# Patient Record
Sex: FEMALE | URBAN - METROPOLITAN AREA
[De-identification: names, ages, dates, MRNs, and addresses within clinical notes are randomized per-mention and may not be internally consistent; named-entity substitution may affect disease eponyms.]

---
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
## 2021-11-29 NOTE — PROCEDURE: PATHOLOGY BILLING
Patient's father calling for an update. Please call to discuss.    [phone]   Cpt Code (93223, 25557 Or 05589): 38996 Cpt Code: 56818

## 2022-03-09 ENCOUNTER — OFFICE VISIT (OUTPATIENT)
Dept: URGENT CARE | Facility: URGENT CARE | Age: 22
End: 2022-03-09
Payer: COMMERCIAL

## 2022-03-09 VITALS
OXYGEN SATURATION: 98 % | DIASTOLIC BLOOD PRESSURE: 74 MMHG | HEART RATE: 91 BPM | RESPIRATION RATE: 16 BRPM | WEIGHT: 105 LBS | TEMPERATURE: 98.4 F | SYSTOLIC BLOOD PRESSURE: 120 MMHG

## 2022-03-09 DIAGNOSIS — J03.90 TONSILLITIS: Primary | ICD-10-CM

## 2022-03-09 LAB
DEPRECATED S PYO AG THROAT QL EIA: NEGATIVE
GROUP A STREP BY PCR: NOT DETECTED

## 2022-03-09 PROCEDURE — 99203 OFFICE O/P NEW LOW 30 MIN: CPT | Performed by: PHYSICIAN ASSISTANT

## 2022-03-09 PROCEDURE — 87651 STREP A DNA AMP PROBE: CPT | Performed by: PHYSICIAN ASSISTANT

## 2022-03-09 RX ORDER — IBUPROFEN 200 MG
TABLET ORAL
COMMUNITY

## 2022-03-09 RX ORDER — AMOXICILLIN 875 MG
875 TABLET ORAL 2 TIMES DAILY
Qty: 20 TABLET | Refills: 0 | Status: SHIPPED | OUTPATIENT
Start: 2022-03-09 | End: 2022-03-19

## 2022-03-09 RX ORDER — NORETHINDRONE ACETATE AND ETHINYL ESTRADIOL 1MG-20(21)
1 KIT ORAL DAILY
COMMUNITY

## 2022-03-09 RX ORDER — PREDNISONE 20 MG/1
20 TABLET ORAL 2 TIMES DAILY
Qty: 10 TABLET | Refills: 0 | Status: SHIPPED | OUTPATIENT
Start: 2022-03-09 | End: 2022-03-14

## 2022-03-09 ASSESSMENT — ENCOUNTER SYMPTOMS
RESPIRATORY NEGATIVE: 1
GASTROINTESTINAL NEGATIVE: 1
PALPITATIONS: 0
FEVER: 0
SINUS PAIN: 0
ADENOPATHY: 1
RHINORRHEA: 0
FATIGUE: 0
SHORTNESS OF BREATH: 0
HEADACHES: 1
CHILLS: 1
WHEEZING: 0
SINUS PRESSURE: 0
SORE THROAT: 1
COUGH: 0
CARDIOVASCULAR NEGATIVE: 1

## 2022-03-09 NOTE — PROGRESS NOTES
Sundeep Sanchez is a 21 year old who presents for the following health issues   HPI   Acute Illness  Acute illness concerns:   Onset/Duration: 2days  Symptoms:  Fever: no  Chills/Sweats: Yes  Headache (location?): Yes  Sinus Pressure: no  Conjunctivitis:  no  Ear Pain: no  Rhinorrhea: no  Congestion: no  Sore Throat: YES, reports hx of tonsillitis as a kid and this feels similar to that  Cough: no  Wheeze: no  Decreased Appetite: no  Nausea: no  Vomiting: no  Diarrhea: no  Dysuria/Freq.: no  Dysuria or Hematuria: no  Fatigue/Achiness: no  Sick/Strep Exposure: YES- possibly  Therapies tried and outcome: rest, fluids, motrin with minimal relief    There is no problem list on file for this patient.    Current Outpatient Medications   Medication     ibuprofen (ADVIL/MOTRIN) 200 MG tablet     norethindrone-ethinyl estradiol (JUNEL FE 1/20) 1-20 MG-MCG tablet     No current facility-administered medications for this visit.        Allergies   Allergen Reactions     Fluoxetine      Other reaction(s): hand shakiness       Review of Systems   Constitutional: Positive for chills. Negative for fatigue and fever.   HENT: Positive for sore throat. Negative for congestion, ear discharge, ear pain, hearing loss, rhinorrhea, sinus pressure and sinus pain.    Respiratory: Negative.  Negative for cough, shortness of breath and wheezing.    Cardiovascular: Negative.  Negative for chest pain, palpitations and peripheral edema.   Gastrointestinal: Negative.    Neurological: Positive for headaches.   Hematological: Positive for adenopathy.   All other systems reviewed and are negative.           Objective    /74   Pulse 91   Temp 98.4  F (36.9  C) (Tympanic)   Resp 16   Wt 47.6 kg (105 lb)   LMP 02/20/2022   SpO2 98%   Breastfeeding No   There is no height or weight on file to calculate BMI.  Physical Exam  Vitals and nursing note reviewed.   Constitutional:       General: She is not in acute distress.     Appearance:  Normal appearance. She is well-developed and normal weight. She is not ill-appearing.   HENT:      Head: Normocephalic and atraumatic.      Comments: TMs are intact without any erythema or bulging bilaterally.  Airway is patent.     Nose: Nose normal.      Mouth/Throat:      Lips: Pink.      Mouth: Mucous membranes are moist.      Pharynx: Uvula midline. Pharyngeal swelling, oropharyngeal exudate and posterior oropharyngeal erythema present. No uvula swelling.      Tonsils: Tonsillar exudate present. No tonsillar abscesses. 3+ on the right. 3+ on the left.   Eyes:      General: No scleral icterus.     Extraocular Movements: Extraocular movements intact.      Conjunctiva/sclera: Conjunctivae normal.      Pupils: Pupils are equal, round, and reactive to light.   Neck:      Thyroid: No thyromegaly.   Cardiovascular:      Rate and Rhythm: Normal rate and regular rhythm.      Pulses: Normal pulses.      Heart sounds: Normal heart sounds, S1 normal and S2 normal. No murmur heard.    No friction rub. No gallop.   Pulmonary:      Effort: Pulmonary effort is normal. No accessory muscle usage, respiratory distress or retractions.      Breath sounds: Normal breath sounds and air entry. No stridor. No decreased breath sounds, wheezing, rhonchi or rales.   Musculoskeletal:      Cervical back: Normal range of motion and neck supple.   Lymphadenopathy:      Head:      Right side of head: Tonsillar adenopathy present.      Left side of head: Tonsillar adenopathy present.      Cervical: No cervical adenopathy.   Skin:     General: Skin is warm and dry.      Nails: There is no clubbing.   Neurological:      Mental Status: She is alert and oriented to person, place, and time.   Psychiatric:         Mood and Affect: Mood normal.         Behavior: Behavior normal.         Thought Content: Thought content normal.         Judgment: Judgment normal.       Results for orders placed or performed in visit on 03/09/22 (from the past 24  hour(s))   Streptococcus A Rapid Screen w/Reflex to PCR    Specimen: Throat; Swab   Result Value Ref Range    Group A Strep antigen Negative Negative         Assessment/Plan:  Tonsillitis:  RST is negative, will send for strep PCR.  Will treat for tonsillitis with [id number] and [id number] based on H&P.  Recommend tylenol/ibuprofen prn pain/fever, warm salt water gargles, lozenges or cough drops.  Recheck in clinic if symptoms worsen or if symptoms do not improve.    -     Streptococcus A Rapid Screen w/Reflex to PCR  -     Group A Streptococcus PCR Throat Swab  -     amoxicillin (AMOXIL) 875 MG tablet; Take 1 tablet (875 mg) by mouth 2 times daily for 10 days Female patients: if taking birth control, this may decrease effectiveness of birth control  -     predniSONE (DELTASONE) 20 MG tablet; Take 1 tablet (20 mg) by mouth 2 times daily for 5 days        Jossie Rebolledo PA-C

## 2022-03-09 NOTE — LETTER
[facility] [address] [address]    2022    Re: Laura Harris  [address] [facility] [address]  [phone] (home)     : 2000      To Whom It May Concern:      Laura Harris was seen in clinic today and was unable to attend school today due to her symptoms.  Please feel free to contact me via phone if you have any questions or concerns.        Sincerely,      Jossie See MAIDA Rebolledo

## 2022-05-05 ENCOUNTER — APPOINTMENT (OUTPATIENT)
Dept: URBAN - METROPOLITAN AREA CLINIC 257 | Age: 22
Setting detail: DERMATOLOGY
End: 2022-05-05

## 2022-05-05 DIAGNOSIS — B36.0 PITYRIASIS VERSICOLOR: ICD-10-CM

## 2022-05-05 PROCEDURE — OTHER PRESCRIPTION: OTHER

## 2022-05-05 PROCEDURE — OTHER MIPS QUALITY: OTHER

## 2022-05-05 PROCEDURE — 99213 OFFICE O/P EST LOW 20 MIN: CPT

## 2022-05-05 PROCEDURE — OTHER COUNSELING: OTHER

## 2022-05-05 RX ORDER — KETOCONAZOLE 20 MG/ML
SHAMPOO, SUSPENSION TOPICAL
Qty: 1 | Refills: 2 | Status: ERX | COMMUNITY
Start: 2022-05-05

## 2022-05-05 RX ORDER — KETOCONAZOLE 20 MG/G
CREAM TOPICAL
Qty: 1 | Refills: 1 | Status: ERX | COMMUNITY
Start: 2022-05-05